# Patient Record
Sex: MALE | Race: WHITE | NOT HISPANIC OR LATINO
[De-identification: names, ages, dates, MRNs, and addresses within clinical notes are randomized per-mention and may not be internally consistent; named-entity substitution may affect disease eponyms.]

---

## 2022-08-30 PROBLEM — Z00.00 ENCOUNTER FOR PREVENTIVE HEALTH EXAMINATION: Status: ACTIVE | Noted: 2022-08-30

## 2022-09-09 ENCOUNTER — APPOINTMENT (OUTPATIENT)
Dept: UROLOGY | Facility: CLINIC | Age: 62
End: 2022-09-09

## 2022-09-09 VITALS
HEART RATE: 93 BPM | BODY MASS INDEX: 27.28 KG/M2 | HEIGHT: 68 IN | SYSTOLIC BLOOD PRESSURE: 119 MMHG | RESPIRATION RATE: 17 BRPM | TEMPERATURE: 97.8 F | WEIGHT: 180 LBS | DIASTOLIC BLOOD PRESSURE: 75 MMHG

## 2022-09-09 DIAGNOSIS — E11.9 TYPE 2 DIABETES MELLITUS W/OUT COMPLICATIONS: ICD-10-CM

## 2022-09-09 DIAGNOSIS — R97.20 ELEVATED PROSTATE, SPECIFIC ANTIGEN [PSA]: ICD-10-CM

## 2022-09-09 PROCEDURE — 99203 OFFICE O/P NEW LOW 30 MIN: CPT

## 2022-09-09 NOTE — ASSESSMENT
[FreeTextEntry1] : 62M with elevated PSA and penile upward bend.\par \par - Discussed biopsy vs MRI vs repeat PSA\par - Patient interested in MRI\par - In regards to penile curvature: patient and partner are not bothered by the curvature physically but he is somewhat mentally bothered by it\par - Can refer to Dr Bowen to fully assess

## 2022-09-09 NOTE — HISTORY OF PRESENT ILLNESS
[FreeTextEntry1] : 62M with h/o elevated PSA (4.75 on recent check at [facility]). \par Referred to Urologist - Marco (Linda)\par Was recommended to undergo biopsy, no MRI.\par Was told by a friend to see Dr Toth.\par \par Also concerned about dorsal curvature of penis.\par Was straight until about 2 years ago.\par Never experienced painful erections.\par No ED, no issues with penetration, wife "likes it" \par But he is somewhat mentally disturbed by it.\par \par PMH: DM\par PSH: none \par SH: nonsmoker, alcohol use\par FH: gastric cancer in father, no known prostate cancer\par Meds: faxiga, metformin

## 2022-09-21 ENCOUNTER — RESULT REVIEW (OUTPATIENT)
Age: 62
End: 2022-09-21

## 2022-09-21 ENCOUNTER — APPOINTMENT (OUTPATIENT)
Dept: MRI IMAGING | Facility: IMAGING CENTER | Age: 62
End: 2022-09-21

## 2022-09-21 ENCOUNTER — OUTPATIENT (OUTPATIENT)
Dept: OUTPATIENT SERVICES | Facility: HOSPITAL | Age: 62
LOS: 1 days | End: 2022-09-21
Payer: COMMERCIAL

## 2022-09-21 DIAGNOSIS — R97.20 ELEVATED PROSTATE SPECIFIC ANTIGEN [PSA]: ICD-10-CM

## 2022-09-21 PROCEDURE — 76498 UNLISTED MR PROCEDURE: CPT

## 2022-09-21 PROCEDURE — A9585: CPT

## 2022-09-21 PROCEDURE — 72197 MRI PELVIS W/O & W/DYE: CPT | Mod: 26

## 2022-09-21 PROCEDURE — 76498P: CUSTOM | Mod: 26

## 2022-09-21 PROCEDURE — 72197 MRI PELVIS W/O & W/DYE: CPT

## 2022-09-22 ENCOUNTER — APPOINTMENT (OUTPATIENT)
Dept: UROLOGY | Facility: CLINIC | Age: 62
End: 2022-09-22